# Patient Record
Sex: FEMALE | Race: BLACK OR AFRICAN AMERICAN | NOT HISPANIC OR LATINO | Employment: OTHER | URBAN - METROPOLITAN AREA
[De-identification: names, ages, dates, MRNs, and addresses within clinical notes are randomized per-mention and may not be internally consistent; named-entity substitution may affect disease eponyms.]

---

## 2018-09-01 ENCOUNTER — HOSPITAL ENCOUNTER (EMERGENCY)
Facility: HOSPITAL | Age: 63
Discharge: HOME | End: 2018-09-01
Attending: EMERGENCY MEDICINE

## 2018-09-01 VITALS
HEIGHT: 61 IN | DIASTOLIC BLOOD PRESSURE: 73 MMHG | HEART RATE: 79 BPM | WEIGHT: 152 LBS | SYSTOLIC BLOOD PRESSURE: 152 MMHG | TEMPERATURE: 98 F | OXYGEN SATURATION: 97 % | RESPIRATION RATE: 18 BRPM | BODY MASS INDEX: 28.7 KG/M2

## 2018-09-01 DIAGNOSIS — T63.481A LOCAL REACTION TO INSECT STING, ACCIDENTAL OR UNINTENTIONAL, INITIAL ENCOUNTER: Primary | ICD-10-CM

## 2018-09-01 PROCEDURE — 99283 EMERGENCY DEPT VISIT LOW MDM: CPT

## 2018-09-01 NOTE — ED PROVIDER NOTES
"Encounter Date: 9/1/2018  SORT MSE:  Pt is a 62 y.o. female who presents for emergent consideration for skin lesion. Pt will be moved to room when one is available, otherwise will wait in waiting room with triage nurse supervision.  Pt arrived by ambulatory. She is not in distress. Orders have been placed. MANUEL Sales DNP ACNP-BC 9/1/2018 2:53 PM        History     Chief Complaint   Patient presents with    Rash     Patient stated " I don't know what bit me, but its been there since Thursday night and its growing."     61 y/o female with no known medical hx c/o redness and itching to right forearm x3 days. She was takling out her trash 2 days ago when she felt something on her arm, followed by her current symptoms. She reports swelling and small blisters with skin discoloration that has seemed to be better today than yesterday. She has been using hydrocortisone cream and neosporin. She denies, fever, chills, arthralgias, headache.           Review of patient's allergies indicates:  Allergies not on file  No past medical history on file.  No past surgical history on file.  No family history on file.  Social History     Tobacco Use    Smoking status: Not on file   Substance Use Topics    Alcohol use: Not on file    Drug use: Not on file     Review of Systems   Constitutional: Negative for chills and fever.   HENT: Negative for sore throat.    Respiratory: Negative for shortness of breath.    Cardiovascular: Negative for chest pain.   Gastrointestinal: Negative for nausea.   Genitourinary: Negative for dysuria.   Musculoskeletal: Negative for arthralgias and back pain.   Skin: Positive for color change and rash.   Neurological: Negative for weakness.   Hematological: Does not bruise/bleed easily.       Physical Exam     Initial Vitals [09/01/18 1453]   BP Pulse Resp Temp SpO2   (!) 149/70 84 16 98.3 °F (36.8 °C) 100 %      MAP       --         Physical Exam    Vitals reviewed.  Constitutional: She appears " well-developed and well-nourished. She is not diaphoretic. No distress.   HENT:   Head: Normocephalic and atraumatic.   Right Ear: External ear normal.   Left Ear: External ear normal.   Nose: Nose normal.   Eyes: Conjunctivae are normal. No scleral icterus.   Neck: Normal range of motion. Neck supple.   Cardiovascular: Normal rate, regular rhythm and intact distal pulses.   Pulmonary/Chest: No respiratory distress.   Musculoskeletal: Normal range of motion.   Neurological: She is alert and oriented to person, place, and time.   Skin: Skin is warm and dry. Rash noted. There is erythema.   Right forearm with erythema and mild swelling to an area 5x8cm with a cluster of small vesicles at the center. No induration or fluctuance. No tenderness.          ED Course   Procedures  Labs Reviewed - No data to display       Imaging Results    None          Medical Decision Making:   ED Management:  63 y/o female with local rash to right forearm with hx and exam most consistent with large local reaction from possible insect. Also considered but unlikely cellulitis, necrotizing fasciitis, abscess, drug eruption, SJS. Pt instructed to use topical steroid and abx cream and f/u with PCP. She verbalized understanding and agreed with plan.                       Clinical Impression:   The encounter diagnosis was Local reaction to insect sting, accidental or unintentional, initial encounter.                             Tushar Briseno PA-C  09/01/18 1551       Tushar Briseno PA-C  09/01/18 1600